# Patient Record
Sex: FEMALE | Race: ASIAN | NOT HISPANIC OR LATINO | Employment: STUDENT | URBAN - METROPOLITAN AREA
[De-identification: names, ages, dates, MRNs, and addresses within clinical notes are randomized per-mention and may not be internally consistent; named-entity substitution may affect disease eponyms.]

---

## 2021-06-07 ENCOUNTER — HOSPITAL ENCOUNTER (EMERGENCY)
Facility: HOSPITAL | Age: 31
Discharge: HOME | End: 2021-06-07
Attending: NURSE PRACTITIONER | Admitting: EMERGENCY MEDICINE

## 2021-06-07 VITALS
RESPIRATION RATE: 18 BRPM | BODY MASS INDEX: 32.21 KG/M2 | HEIGHT: 62 IN | TEMPERATURE: 98.3 F | DIASTOLIC BLOOD PRESSURE: 69 MMHG | HEART RATE: 66 BPM | OXYGEN SATURATION: 98 % | WEIGHT: 175.04 LBS | SYSTOLIC BLOOD PRESSURE: 122 MMHG

## 2021-06-07 DIAGNOSIS — L02.11 CUTANEOUS ABSCESS OF NECK: Primary | ICD-10-CM

## 2021-06-07 PROCEDURE — 99282 EMERGENCY DEPT VISIT SF MDM: CPT

## 2021-06-07 RX ORDER — DOXYCYCLINE HYCLATE 50 MG/1
100 CAPSULE ORAL 2 TIMES DAILY
Qty: 40 CAPSULE | Refills: 0 | Status: SHIPPED | OUTPATIENT
Start: 2021-06-07 | End: 2021-06-17

## 2021-06-07 NOTE — ED PROVIDER NOTES
Subjective   Tender lump on back of neck x 2 days.       History provided by:  Patient  Abscess  Location:  Head/neck  Head/neck abscess location:  L neck  Size:  Pea sized   Abscess quality: painful    Abscess quality: not draining, no fluctuance, no redness, no warmth and not weeping    Red streaking: no    Duration:  2 days  Progression:  Unchanged  Pain details:     Quality:  Aching    Severity:  Mild    Duration:  2 days    Timing:  Constant    Progression:  Unchanged  Chronicity:  New  Context: not diabetes and not insect bite/sting    Relieved by:  Nothing  Worsened by:  Nothing  Ineffective treatments:  None tried  Associated symptoms: headaches    Associated symptoms: no anorexia, no fatigue, no fever, no nausea and no vomiting        Review of Systems   Constitutional: Negative for fatigue and fever.   Eyes: Negative.    Respiratory: Negative.    Cardiovascular: Negative.    Gastrointestinal: Negative for anorexia, nausea and vomiting.   Genitourinary: Negative.    Musculoskeletal: Negative.    Neurological: Positive for headaches.   Psychiatric/Behavioral: Negative.        History reviewed. No pertinent past medical history.    Allergies   Allergen Reactions    Penicillins Anaphylaxis    Cephalosporins Hives       History reviewed. No pertinent surgical history.    History reviewed. No pertinent family history.    Social History     Socioeconomic History    Marital status:      Spouse name: Not on file    Number of children: Not on file    Years of education: Not on file    Highest education level: Not on file           Objective   Physical Exam  Nursing note reviewed.   Constitutional:       Appearance: Normal appearance.   HENT:      Head: Normocephalic.      Right Ear: Tympanic membrane normal.      Left Ear: Tympanic membrane normal.      Nose: Nose normal.      Mouth/Throat:      Mouth: Mucous membranes are moist.   Eyes:      Extraocular Movements: Extraocular movements intact.       Conjunctiva/sclera: Conjunctivae normal.      Pupils: Pupils are equal, round, and reactive to light.   Neck:      Comments: Posterior left side of neck with pea sized abcess, no fluctuance, no drainage, no erythema.  Cardiovascular:      Rate and Rhythm: Normal rate and regular rhythm.      Pulses: Normal pulses.   Pulmonary:      Effort: Pulmonary effort is normal.   Abdominal:      General: Abdomen is flat.   Musculoskeletal:         General: Normal range of motion.      Cervical back: Normal range of motion.   Neurological:      General: No focal deficit present.      Mental Status: She is alert.   Psychiatric:         Mood and Affect: Mood normal.         Procedures           ED Course                                           MDM  Number of Diagnoses or Management Options  Cutaneous abscess of neck: minor  Risk of Complications, Morbidity, and/or Mortality  Presenting problems: minimal  Diagnostic procedures: minimal  Management options: minimal        Final diagnoses:   Cutaneous abscess of neck       ED Disposition  ED Disposition       ED Disposition Condition Comment    Discharge Stable             MalikaAgusto hobbscash Cheryon, DO  [address] [facility] [place] [address]  [phone]    In 2 days  for wound recheck       Medication List        ASK your doctor about these medications      doxycycline 50 MG capsule  Commonly known as: VIBRAMYCIN  Take 2 capsules by mouth 2 (Two) Times a Day for 10 days.  Ask about: Should I take this medication?               Where to Get Your Medications        These medications were sent to [id number] - [place], [place] - [address] AT [facility] - [phone]  - [phone]  [address] [place] [address]      Hours: 24-hours Phone: [phone]   doxycycline 50 MG capsule          Nina Govea APRN  06/07/21 1803       Nina Govea APRN  06/07/21 2100       Nina Govea APRN  06/18/21 4625

## 2021-07-03 ENCOUNTER — HOSPITAL ENCOUNTER (EMERGENCY)
Facility: HOSPITAL | Age: 31
Discharge: HOME | End: 2021-07-03
Admitting: EMERGENCY MEDICINE

## 2021-07-03 ENCOUNTER — APPOINTMENT (OUTPATIENT)
Dept: GENERAL RADIOLOGY | Facility: HOSPITAL | Age: 31
End: 2021-07-03

## 2021-07-03 VITALS
OXYGEN SATURATION: 100 % | HEART RATE: 115 BPM | WEIGHT: 174.6 LBS | BODY MASS INDEX: 32.13 KG/M2 | RESPIRATION RATE: 22 BRPM | HEIGHT: 62 IN | TEMPERATURE: 98.1 F | DIASTOLIC BLOOD PRESSURE: 87 MMHG | SYSTOLIC BLOOD PRESSURE: 131 MMHG

## 2021-07-03 DIAGNOSIS — J06.9 VIRAL UPPER RESPIRATORY TRACT INFECTION: Primary | ICD-10-CM

## 2021-07-03 PROCEDURE — 71045 X-RAY EXAM CHEST 1 VIEW: CPT

## 2021-07-03 PROCEDURE — 94640 AIRWAY INHALATION TREATMENT: CPT

## 2021-07-03 PROCEDURE — 94799 UNLISTED PULMONARY SVC/PX: CPT

## 2021-07-03 PROCEDURE — 99282 EMERGENCY DEPT VISIT SF MDM: CPT

## 2021-07-03 RX ORDER — ALBUTEROL SULFATE 90 UG/1
2 AEROSOL, METERED RESPIRATORY (INHALATION) EVERY 4 HOURS PRN
Qty: 90 G | Refills: 0 | Status: SHIPPED | OUTPATIENT
Start: 2021-07-03

## 2021-07-03 RX ORDER — PREDNISONE 50 MG/1
50 TABLET ORAL DAILY
Qty: 5 TABLET | Refills: 0 | Status: SHIPPED | OUTPATIENT
Start: 2021-07-03 | End: 2021-08-24

## 2021-07-03 RX ORDER — CETIRIZINE HYDROCHLORIDE 10 MG/1
10 TABLET ORAL DAILY
COMMUNITY
End: 2022-01-26

## 2021-07-03 RX ORDER — IPRATROPIUM BROMIDE AND ALBUTEROL SULFATE 2.5; .5 MG/3ML; MG/3ML
3 SOLUTION RESPIRATORY (INHALATION) ONCE
Status: COMPLETED | OUTPATIENT
Start: 2021-07-03 | End: 2021-07-03

## 2021-07-03 RX ORDER — PHENTERMINE HYDROCHLORIDE 30 MG/1
30 CAPSULE ORAL EVERY MORNING
COMMUNITY
End: 2022-01-26

## 2021-07-03 RX ORDER — FLUTICASONE PROPIONATE 50 MCG
2 SPRAY, SUSPENSION (ML) NASAL DAILY
COMMUNITY
End: 2022-01-26

## 2021-07-03 RX ADMIN — IPRATROPIUM BROMIDE AND ALBUTEROL SULFATE 3 ML: .5; 2.5 SOLUTION RESPIRATORY (INHALATION) at 09:56

## 2021-07-04 NOTE — ED PROVIDER NOTES
Subjective     URI  Presenting symptoms: cough    Presenting symptoms: no congestion, no ear pain, no fever and no sore throat    Severity:  Mild  Onset quality:  Gradual  Duration:  2 days  Timing:  Constant  Progression:  Unchanged  Chronicity:  Recurrent  Relieved by:  Nothing  Worsened by:  Nothing  Ineffective treatments:  None tried  Associated symptoms: wheezing    Associated symptoms: no arthralgias, no headaches, no neck pain, no sinus pain, no sneezing and no swollen glands    Risk factors: not elderly, no chronic kidney disease, no chronic respiratory disease, no diabetes mellitus, no immunosuppression, no recent illness, no recent travel and no sick contacts        Review of Systems   Constitutional: Negative for chills and fever.   HENT: Negative for congestion, ear pain, sinus pain, sneezing and sore throat.    Eyes: Negative for pain.   Respiratory: Positive for cough and wheezing. Negative for chest tightness and shortness of breath.    Cardiovascular: Negative for chest pain.   Gastrointestinal: Negative for abdominal pain, diarrhea, nausea and vomiting.   Genitourinary: Negative for flank pain and hematuria.   Musculoskeletal: Negative for arthralgias, joint swelling and neck pain.   Skin: Negative for pallor.   Neurological: Negative for seizures and headaches.   All other systems reviewed and are negative.      Past Medical History:   Diagnosis Date   • Asthma    • Celiac disease        Allergies   Allergen Reactions   • Penicillins Anaphylaxis   • Naproxen Hives       History reviewed. No pertinent surgical history.    History reviewed. No pertinent family history.    Social History     Socioeconomic History   • Marital status:      Spouse name: Not on file   • Number of children: Not on file   • Years of education: Not on file   • Highest education level: Not on file   Tobacco Use   • Smoking status: Heavy Tobacco Smoker     Packs/day: 0.50   Vaping Use   • Vaping Use: Never used    Substance and Sexual Activity   • Alcohol use: Yes   • Drug use: Not Currently           Objective   Physical Exam  Vitals and nursing note reviewed.   Constitutional:       General: She is not in acute distress.     Appearance: Normal appearance. She is not toxic-appearing.   HENT:      Head: Normocephalic and atraumatic.      Mouth/Throat:      Mouth: Mucous membranes are moist.   Eyes:      Extraocular Movements: Extraocular movements intact.      Pupils: Pupils are equal, round, and reactive to light.   Cardiovascular:      Rate and Rhythm: Normal rate and regular rhythm.      Pulses: Normal pulses.      Heart sounds: Normal heart sounds.   Pulmonary:      Effort: Pulmonary effort is normal. No respiratory distress.      Breath sounds: Examination of the right-upper field reveals wheezing. Examination of the left-upper field reveals wheezing. Wheezing present. No decreased breath sounds.   Abdominal:      General: Abdomen is flat.      Palpations: Abdomen is soft.      Tenderness: There is no abdominal tenderness.   Musculoskeletal:         General: Normal range of motion.      Cervical back: Normal range of motion and neck supple.   Skin:     General: Skin is warm and dry.   Neurological:      Mental Status: She is alert and oriented to person, place, and time. Mental status is at baseline.         Procedures           ED Course                                           MDM  Number of Diagnoses or Management Options  Viral upper respiratory tract infection: minor  Diagnosis management comments: Patient has no respiratory distress, no signs of pneumonia.  She does have a history of asthma related to allergies.  I will send her home on some albuterol.  Patient feels better after 1 breathing treatment       Amount and/or Complexity of Data Reviewed  Clinical lab tests: reviewed  Tests in the radiology section of CPT®: ordered and reviewed  Tests in the medicine section of CPT®: reviewed    Risk of Complications,  Morbidity, and/or Mortality  Presenting problems: moderate  Diagnostic procedures: moderate  Management options: moderate        Final diagnoses:   Viral upper respiratory tract infection       ED Disposition  ED Disposition     ED Disposition Condition Comment    Discharge Stable           Topher Daly DO  [address] [facility] [address] [phone]               Medication List      New Prescriptions    albuterol sulfate  (90 Base) MCG/ACT inhaler  Commonly known as: PROVENTIL HFA;VENTOLIN HFA;PROAIR HFA  Inhale 2 puffs Every 4 (Four) Hours As Needed for Wheezing.     predniSONE 50 MG tablet  Commonly known as: DELTASONE  Take 1 tablet by mouth Daily.           Where to Get Your Medications      These medications were sent to [facility][id number] - [place], [place] - [address] AT [facility] - [phone] [facility] [phone]   [address] [place] [address]    Hours: 24-hours Phone: [phone]   · albuterol sulfate  (90 Base) MCG/ACT inhaler  · predniSONE 50 MG tablet          Wilfrido Desir APRN  07/04/21 1021

## 2021-08-24 ENCOUNTER — HOSPITAL ENCOUNTER (EMERGENCY)
Facility: HOSPITAL | Age: 31
Discharge: HOME | End: 2021-08-24
Attending: EMERGENCY MEDICINE | Admitting: EMERGENCY MEDICINE

## 2021-08-24 VITALS
SYSTOLIC BLOOD PRESSURE: 119 MMHG | BODY MASS INDEX: 30.95 KG/M2 | TEMPERATURE: 98.7 F | WEIGHT: 168.21 LBS | OXYGEN SATURATION: 97 % | HEART RATE: 99 BPM | RESPIRATION RATE: 18 BRPM | HEIGHT: 62 IN | DIASTOLIC BLOOD PRESSURE: 69 MMHG

## 2021-08-24 DIAGNOSIS — L73.9 FOLLICULITIS: Primary | ICD-10-CM

## 2021-08-24 LAB
ALBUMIN SERPL-MCNC: 4.1 G/DL (ref 3.5–5.2)
ALBUMIN/GLOB SERPL: 1.2 G/DL
ALP SERPL-CCNC: 83 U/L (ref 39–117)
ALT SERPL W P-5'-P-CCNC: 17 U/L (ref 1–33)
ANION GAP SERPL CALCULATED.3IONS-SCNC: 13 MMOL/L (ref 5–15)
AST SERPL-CCNC: 18 U/L (ref 1–32)
BASOPHILS # BLD AUTO: 0.08 10*3/MM3 (ref 0–0.2)
BASOPHILS NFR BLD AUTO: 0.5 % (ref 0–1.5)
BILIRUB SERPL-MCNC: 0.3 MG/DL (ref 0–1.2)
BUN SERPL-MCNC: 11 MG/DL (ref 6–20)
BUN/CREAT SERPL: 13.1 (ref 7–25)
CALCIUM SPEC-SCNC: 9.1 MG/DL (ref 8.6–10.5)
CHLORIDE SERPL-SCNC: 102 MMOL/L (ref 98–107)
CO2 SERPL-SCNC: 22 MMOL/L (ref 22–29)
CREAT SERPL-MCNC: 0.84 MG/DL (ref 0.57–1)
DEPRECATED RDW RBC AUTO: 37.2 FL (ref 37–54)
EOSINOPHIL # BLD AUTO: 1.27 10*3/MM3 (ref 0–0.4)
EOSINOPHIL NFR BLD AUTO: 8.1 % (ref 0.3–6.2)
ERYTHROCYTE [DISTWIDTH] IN BLOOD BY AUTOMATED COUNT: 11.9 % (ref 12.3–15.4)
GFR SERPL CREATININE-BSD FRML MDRD: 79 ML/MIN/1.73
GLOBULIN UR ELPH-MCNC: 3.3 GM/DL
GLUCOSE SERPL-MCNC: 89 MG/DL (ref 65–99)
HCG INTACT+B SERPL-ACNC: <0.5 MIU/ML
HCT VFR BLD AUTO: 36.2 % (ref 34–46.6)
HGB BLD-MCNC: 12.6 G/DL (ref 12–15.9)
IMM GRANULOCYTES # BLD AUTO: 0.05 10*3/MM3 (ref 0–0.05)
IMM GRANULOCYTES NFR BLD AUTO: 0.3 % (ref 0–0.5)
LYMPHOCYTES # BLD AUTO: 4.08 10*3/MM3 (ref 0.7–3.1)
LYMPHOCYTES NFR BLD AUTO: 25.9 % (ref 19.6–45.3)
MCH RBC QN AUTO: 30.1 PG (ref 26.6–33)
MCHC RBC AUTO-ENTMCNC: 34.8 G/DL (ref 31.5–35.7)
MCV RBC AUTO: 86.6 FL (ref 79–97)
MONOCYTES # BLD AUTO: 1.19 10*3/MM3 (ref 0.1–0.9)
MONOCYTES NFR BLD AUTO: 7.6 % (ref 5–12)
NEUTROPHILS NFR BLD AUTO: 57.6 % (ref 42.7–76)
NEUTROPHILS NFR BLD AUTO: 9.06 10*3/MM3 (ref 1.7–7)
NRBC BLD AUTO-RTO: 0 /100 WBC (ref 0–0.2)
PLATELET # BLD AUTO: 388 10*3/MM3 (ref 140–450)
PMV BLD AUTO: 9 FL (ref 6–12)
POTASSIUM SERPL-SCNC: 3.9 MMOL/L (ref 3.5–5.2)
PROT SERPL-MCNC: 7.4 G/DL (ref 6–8.5)
RBC # BLD AUTO: 4.18 10*6/MM3 (ref 3.77–5.28)
SODIUM SERPL-SCNC: 137 MMOL/L (ref 136–145)
WBC # BLD AUTO: 15.73 10*3/MM3 (ref 3.4–10.8)

## 2021-08-24 PROCEDURE — 99283 EMERGENCY DEPT VISIT LOW MDM: CPT

## 2021-08-24 PROCEDURE — 80053 COMPREHEN METABOLIC PANEL: CPT | Performed by: PHYSICIAN ASSISTANT

## 2021-08-24 PROCEDURE — 85025 COMPLETE CBC W/AUTO DIFF WBC: CPT | Performed by: PHYSICIAN ASSISTANT

## 2021-08-24 PROCEDURE — 25010000002 DEXAMETHASONE PER 1 MG: Performed by: PHYSICIAN ASSISTANT

## 2021-08-24 PROCEDURE — 96375 TX/PRO/DX INJ NEW DRUG ADDON: CPT

## 2021-08-24 PROCEDURE — 84702 CHORIONIC GONADOTROPIN TEST: CPT | Performed by: PHYSICIAN ASSISTANT

## 2021-08-24 PROCEDURE — 96374 THER/PROPH/DIAG INJ IV PUSH: CPT

## 2021-08-24 RX ORDER — SULFAMETHOXAZOLE AND TRIMETHOPRIM 800; 160 MG/1; MG/1
1 TABLET ORAL ONCE
Status: COMPLETED | OUTPATIENT
Start: 2021-08-24 | End: 2021-08-24

## 2021-08-24 RX ORDER — PREDNISONE 20 MG/1
20 TABLET ORAL DAILY
Qty: 4 TABLET | Refills: 0 | Status: SHIPPED | OUTPATIENT
Start: 2021-08-24 | End: 2022-01-26

## 2021-08-24 RX ORDER — SULFAMETHOXAZOLE AND TRIMETHOPRIM 800; 160 MG/1; MG/1
1 TABLET ORAL 2 TIMES DAILY
Qty: 14 TABLET | Refills: 0 | Status: SHIPPED | OUTPATIENT
Start: 2021-08-24 | End: 2022-01-26

## 2021-08-24 RX ORDER — DEXAMETHASONE SODIUM PHOSPHATE 10 MG/ML
10 INJECTION INTRAMUSCULAR; INTRAVENOUS ONCE
Status: COMPLETED | OUTPATIENT
Start: 2021-08-24 | End: 2021-08-24

## 2021-08-24 RX ORDER — DOXYCYCLINE 100 MG/1
100 CAPSULE ORAL 2 TIMES DAILY
Qty: 14 CAPSULE | Refills: 0 | Status: SHIPPED | OUTPATIENT
Start: 2021-08-24 | End: 2022-01-26

## 2021-08-24 RX ORDER — FAMOTIDINE 10 MG/ML
20 INJECTION, SOLUTION INTRAVENOUS ONCE
Status: COMPLETED | OUTPATIENT
Start: 2021-08-24 | End: 2021-08-24

## 2021-08-24 RX ORDER — SODIUM CHLORIDE 0.9 % (FLUSH) 0.9 %
10 SYRINGE (ML) INJECTION AS NEEDED
Status: DISCONTINUED | OUTPATIENT
Start: 2021-08-24 | End: 2021-08-24 | Stop reason: HOSPADM

## 2021-08-24 RX ADMIN — FAMOTIDINE 20 MG: 10 INJECTION INTRAVENOUS at 17:50

## 2021-08-24 RX ADMIN — DEXAMETHASONE SODIUM PHOSPHATE 10 MG: 10 INJECTION INTRAMUSCULAR; INTRAVENOUS at 17:51

## 2021-08-24 RX ADMIN — SULFAMETHOXAZOLE AND TRIMETHOPRIM 160 MG: 800; 160 TABLET ORAL at 17:50

## 2021-08-24 NOTE — ED PROVIDER NOTES
Subjective   Pt states she has atopic dermatitis and has started to develop places on other parts of the body that could be pus filled vs lymph node.      History provided by:  Patient   used: No    Abscess  Location:  Leg, shoulder/arm and torso  Leg abscess location:  L lower leg and R lower leg  Abscess quality: itching and painful    Abscess quality: no redness and no warmth    Red streaking: no    Duration:  2 days  Progression:  Worsening  Pain details:     Quality:  Dull    Severity:  Mild    Duration:  2 days    Timing:  Intermittent    Progression:  Worsening  Chronicity:  New  Relieved by:  Nothing  Worsened by:  Nothing  Ineffective treatments:  None tried  Associated symptoms: no fatigue and no fever    Rash  Associated symptoms: no fatigue and no fever        Review of Systems   Constitutional: Negative for appetite change, chills, fatigue and fever.   HENT: Negative.    Eyes: Negative.  Negative for photophobia.   Respiratory: Negative.    Gastrointestinal: Negative.    Endocrine: Negative.    Genitourinary: Negative.    Musculoskeletal: Negative.    Skin: Positive for rash.   Allergic/Immunologic: Negative.  Negative for immunocompromised state.   Neurological: Negative.    Hematological: Negative.    Psychiatric/Behavioral: Negative.    All other systems reviewed and are negative.      Past Medical History:   Diagnosis Date   • Asthma    • Atopic dermatitis    • Celiac disease        Allergies   Allergen Reactions   • Penicillins Anaphylaxis   • Naproxen Hives       Past Surgical History:   Procedure Laterality Date   • CHOLECYSTECTOMY     • OVARY SURGERY      ovarian cyst removed       History reviewed. No pertinent family history.    Social History     Socioeconomic History   • Marital status:      Spouse name: Not on file   • Number of children: Not on file   • Years of education: Not on file   • Highest education level: Not on file   Tobacco Use   • Smoking status:  Heavy Tobacco Smoker     Packs/day: 0.50   Vaping Use   • Vaping Use: Never used   Substance and Sexual Activity   • Alcohol use: Yes     Comment: socially   • Drug use: Not Currently           Objective   Physical Exam  Vitals and nursing note reviewed.   Constitutional:       General: She is not in acute distress.     Appearance: Normal appearance. She is not toxic-appearing.   HENT:      Head: Normocephalic and atraumatic.      Mouth/Throat:      Mouth: Mucous membranes are moist.   Eyes:      Extraocular Movements: Extraocular movements intact.      Pupils: Pupils are equal, round, and reactive to light.   Cardiovascular:      Rate and Rhythm: Normal rate and regular rhythm.      Pulses: Normal pulses.      Heart sounds: Normal heart sounds.   Pulmonary:      Effort: Pulmonary effort is normal. No respiratory distress.      Breath sounds: Normal breath sounds.   Abdominal:      General: Abdomen is flat.      Palpations: Abdomen is soft.      Tenderness: There is no abdominal tenderness.   Musculoskeletal:         General: Normal range of motion.      Cervical back: Normal range of motion and neck supple.   Skin:     General: Skin is warm and dry.      Comments: Small palpable areas on lower legs with area with folliculitis like presentation  Palpable areas on upper right back with no signs of induration, warmth or erythema   Neurological:      Mental Status: She is alert and oriented to person, place, and time. Mental status is at baseline.   Psychiatric:         Mood and Affect: Mood normal.         Behavior: Behavior normal.         Thought Content: Thought content normal.         Judgment: Judgment normal.         Procedures           ED Course                                           MDM  Number of Diagnoses or Management Options  Diagnosis management comments: Pt is a 30 y.o. female that presents today with Patient presents with:  Abscess       Work up today:  Lab Results (last 24 hours)     Procedure  Component Value Units Date/Time    CBC & Differential ([id number])  (Abnormal) Collected: 08/24/21 1624    Specimen: Blood Updated: 08/24/21 1631    Narrative:      The following orders were created for panel order CBC & Differential.  Procedure                               Abnormality         Status                       ---------                               -----------         ------                       CBC Auto Differential([id number])        Abnormal            Final result                   Please view results for these tests on the individual orders.    Comprehensive Metabolic Panel ([id number]) Collected: 08/24/21 1624    Specimen: Blood Updated: 08/24/21 1655     Glucose 89 mg/dL      BUN 11 mg/dL      Creatinine 0.84 mg/dL      Sodium 137 mmol/L      Potassium 3.9 mmol/L      Comment: Slight hemolysis detected by analyzer. Results may be affected.          Chloride 102 mmol/L      CO2 22.0 mmol/L      Calcium 9.1 mg/dL      Total Protein 7.4 g/dL      Albumin 4.10 g/dL      ALT (SGPT) 17 U/L      AST (SGOT) 18 U/L      Comment: Slight hemolysis detected by analyzer. Results may be affected.          Alkaline Phosphatase 83 U/L      Total Bilirubin 0.3 mg/dL      eGFR Non African Amer 79 mL/min/1.73      Globulin 3.3 gm/dL      A/G Ratio 1.2 g/dL      BUN/Creatinine Ratio 13.1     Anion Gap 13.0 mmol/L     Narrative:      GFR Normal >60  Chronic Kidney Disease <60  Kidney Failure <15      hCG, Quantitative, Pregnancy ([id number]) Collected: 08/24/21 1624    Specimen: Blood Updated: 08/24/21 1654     HCG Quantitative <0.50 mIU/mL     Narrative:      HCG Ranges by Gestational Age    Females - non-pregnant premenopausal   </= 1mIU/mL HCG  Females - postmenopausal               </= 7mIU/mL HCG    3 Weeks       5.4   -      72 mIU/mL  4 Weeks      10.2   -     708 mIU/mL  5 Weeks       217   -   8,245 mIU/mL  6 Weeks       152   -  32,177 mIU/mL  7 Weeks     4,059   - 153,767 mIU/mL  8 Weeks    31,366   -  149,094 mIU/mL  9 Weeks    59,109   - 135,901 mIU/mL  10 Weeks   44,186   - 170,409 mIU/mL  12 Weeks   27,107   - 201,615 mIU/mL  14 Weeks   24,302   -  93,646 mIU/mL  15 Weeks   12,540   -  69,747 mIU/mL  16 Weeks    8,904   -  55,332 mIU/mL  17 Weeks    8,240   -  51,793 mIU/mL  18 Weeks    9,649   -  55,271 mIU/mL    Results may be falsely decreased if patient taking Biotin.      CBC Auto Differential ([id number])  (Abnormal) Collected: 08/24/21 1624    Specimen: Blood Updated: 08/24/21 1631     WBC 15.73 10*3/mm3      RBC 4.18 10*6/mm3      Hemoglobin 12.6 g/dL      Hematocrit 36.2 %      MCV 86.6 fL      MCH 30.1 pg      MCHC 34.8 g/dL      RDW 11.9 %      RDW-SD 37.2 fl      MPV 9.0 fL      Platelets 388 10*3/mm3      Neutrophil % 57.6 %      Lymphocyte % 25.9 %      Monocyte % 7.6 %      Eosinophil % 8.1 %      Basophil % 0.5 %      Immature Grans % 0.3 %      Neutrophils, Absolute 9.06 10*3/mm3      Lymphocytes, Absolute 4.08 10*3/mm3      Monocytes, Absolute 1.19 10*3/mm3      Eosinophils, Absolute 1.27 10*3/mm3      Basophils, Absolute 0.08 10*3/mm3      Immature Grans, Absolute 0.05 10*3/mm3      nRBC 0.0 /100 WBC       No results found.   @No orders to display       Pt presents with history of atopic dermatitis and recent progression of erythematous areas on lower legs consistent with folliculitis and also a few areas around right breast and right upper shoulder.  Upper shoulder and right breast areas are mobile and tender no erythema or active drainage.   No cervical, axillary, or inguinal lymphadenopathy is appreciated on exam.   CBC does show mild elevation in WBC count with some increase in neutrophils.   I gave IV steroid and Bactrim while here. I will send patient home on more antibiotics for folliculitis with referral to derm and also to have PCP recheck labs in one week.  Vitals WNL. Denies fever or chills.  Pt is otherwise non toxic and non ill appearing and stable for d/c home.  Pt is in  agreement with this.  All questions answered at bedside.          Amount and/or Complexity of Data Reviewed  Clinical lab tests: reviewed    Risk of Complications, Morbidity, and/or Mortality  Presenting problems: low  Diagnostic procedures: low  Management options: low    Patient Progress  Patient progress: stable      Final diagnoses:   Folliculitis       ED Disposition  ED Disposition     ED Disposition Condition Comment    Discharge Stable           Anson Lopes MD  [address] [facility]  [place] [id number] [phone]    Schedule an appointment as soon as possible for a visit in 1 day      Topher Daly DO  [address] [facility] [place] [address]  [phone]    In 2 days  for recheck and possible redraw of blood work to monitor White blood count         Medication List      New Prescriptions    doxycycline 100 MG capsule  Commonly known as: MONODOX  Take 1 capsule by mouth 2 (Two) Times a Day.     predniSONE 20 MG tablet  Commonly known as: DELTASONE  Take 1 tablet by mouth Daily.     sulfamethoxazole-trimethoprim 800-160 MG per tablet  Commonly known as: BACTRIM DS,SEPTRA DS  Take 1 tablet by mouth 2 (Two) Times a Day.           Where to Get Your Medications      These medications were sent to [facility][id number] - [place] - [address] AT [facility] - [phone]  - [phone]   [address] [place] [address]    Hours: 24-hours Phone: [phone]   · doxycycline 100 MG capsule  · predniSONE 20 MG tablet  · sulfamethoxazole-trimethoprim 800-160 MG per tablet          Hernán Gardner PA  08/24/21 8619

## 2022-01-26 PROCEDURE — U0004 COV-19 TEST NON-CDC HGH THRU: HCPCS | Performed by: NURSE PRACTITIONER

## 2022-02-01 PROCEDURE — 87086 URINE CULTURE/COLONY COUNT: CPT | Performed by: NURSE PRACTITIONER

## 2022-04-17 ENCOUNTER — HOSPITAL ENCOUNTER (EMERGENCY)
Facility: HOSPITAL | Age: 32
Discharge: HOME | End: 2022-04-18
Attending: EMERGENCY MEDICINE | Admitting: EMERGENCY MEDICINE

## 2022-04-17 VITALS
HEIGHT: 62 IN | OXYGEN SATURATION: 96 % | SYSTOLIC BLOOD PRESSURE: 131 MMHG | DIASTOLIC BLOOD PRESSURE: 90 MMHG | WEIGHT: 180.34 LBS | TEMPERATURE: 98.1 F | HEART RATE: 78 BPM | RESPIRATION RATE: 16 BRPM | BODY MASS INDEX: 33.19 KG/M2

## 2022-04-17 DIAGNOSIS — L03.211 FACIAL CELLULITIS: Primary | ICD-10-CM

## 2022-04-17 PROCEDURE — 99283 EMERGENCY DEPT VISIT LOW MDM: CPT

## 2022-04-17 RX ORDER — DOXYCYCLINE 100 MG/1
100 CAPSULE ORAL 2 TIMES DAILY
Qty: 14 CAPSULE | Refills: 0 | Status: SHIPPED | OUTPATIENT
Start: 2022-04-17 | End: 2022-04-24

## 2022-04-17 RX ORDER — DOXYCYCLINE 100 MG/1
100 CAPSULE ORAL ONCE
Status: COMPLETED | OUTPATIENT
Start: 2022-04-18 | End: 2022-04-18

## 2022-04-18 RX ADMIN — DOXYCYCLINE 100 MG: 100 CAPSULE ORAL at 00:08

## 2022-04-18 NOTE — ED PROVIDER NOTES
Time: 11:26 PM EDT  Arrived by: TEMI  Chief Complaint: Facial swelling  History provided by: Patient      History of Present Illness:  Patient is a 31 y.o. year old female that presents to the emergency department with complaint of left facial swelling that started yesterday when she developed a small bump on her left cheek approximately 1 inch the corner of her mouth horizontally.  She denies any fever/chills, nausea/vomiting, difficulty swallowing, shortness of breath or other concerns.       used: No    Illness  Location:  Facial swelling,  Severity:  Moderate  Onset quality:  Sudden  Duration:  1 day  Timing:  Constant  Progression:  Worsening  Chronicity:  New  Relieved by:  Tylenol, ibuprofen  Worsened by:  Nothing  Ineffective treatments:  None tried  Associated symptoms: no congestion, no cough, no ear pain, no fever, no nausea, no rhinorrhea, no shortness of breath and no vomiting            Similar Symptoms Previously: No  Recently seen: No      Patient Care Team  Primary Care Provider: None listed    Past Medical History:     Allergies   Allergen Reactions   • Penicillins Anaphylaxis   • Naproxen Hives     Past Medical History:   Diagnosis Date   • Anxiety    • Asthma    • Atopic dermatitis    • Celiac disease      Past Surgical History:   Procedure Laterality Date   • CHOLECYSTECTOMY     • OVARY SURGERY      ovarian cyst removed     History reviewed. No pertinent family history.    Home Medications:  Prior to Admission medications    Medication Sig Start Date End Date Taking? Authorizing Provider   albuterol sulfate  (90 Base) MCG/ACT inhaler Inhale 2 puffs Every 4 (Four) Hours As Needed for Wheezing. 7/3/21   Wilfrido Desir APRN   brompheniramine-pseudoephedrine-DM 30-2-10 MG/5ML syrup Take 10 mL by mouth 4 (Four) Times a Day As Needed for Congestion, Cough or Allergies. 4/3/22   Jeremías Truong PA   cetirizine (zyrTEC) 10 MG tablet Take 1 tablet by mouth Daily for 30 days.  "2/15/22 3/17/22  Shanta Canchola APRN   divalproex (DEPAKOTE) 125 MG DR tablet Take 125 mg by mouth every night at bedtime.    Emergency, Nurse [facility], RN   fluticasone (FLONASE) 50 MCG/ACT nasal spray 2 sprays into the nostril(s) as directed by provider Daily for 30 days. 2/15/22 3/17/22  Shanta Canchola APRN        Social History:   PT  reports that she has been smoking. She has been smoking about 0.50 packs per day. She has never used smokeless tobacco. She reports current alcohol use. She reports previous drug use.    Record Review:  I have reviewed the patient's records in [facility].     Review of Systems  Review of Systems   Constitutional: Negative for fever.   HENT: Negative for congestion, ear pain and rhinorrhea.    Respiratory: Negative for cough and shortness of breath.    Gastrointestinal: Negative for nausea and vomiting.   Skin: Positive for wound.   All other systems reviewed and are negative.       Physical Exam    /90 (BP Location: Left arm, Patient Position: Sitting)   Pulse 78   Temp 98.1 °F (36.7 °C)   Resp 16   Ht 157.5 cm (62\")   Wt 81.8 kg (180 lb 5.4 oz)   SpO2 96%   BMI 32.98 kg/m²     Physical Exam  Vitals and nursing note reviewed.   Constitutional:       General: She is not in acute distress.     Appearance: Normal appearance. She is not toxic-appearing.   HENT:      Head: Normocephalic and atraumatic.        Comments: There is a small superficial abrasion on the left cheek with underlying swelling and mild erythema.  Lesion is painless.  No crepitus, no fluctuance, no pointing.     Right Ear: Tympanic membrane and ear canal normal.      Left Ear: Tympanic membrane and ear canal normal.      Mouth/Throat:      Lips: Pink.      Mouth: Mucous membranes are moist.      Pharynx: Uvula midline. No pharyngeal swelling, posterior oropharyngeal erythema or uvula swelling.      Tonsils: No tonsillar exudate.      Comments: No drooling noted  Eyes:      General: No scleral " "icterus.  Cardiovascular:      Rate and Rhythm: Normal rate and regular rhythm.      Pulses:           Radial pulses are 2+ on the right side and 2+ on the left side.      Heart sounds: Normal heart sounds.   Pulmonary:      Effort: Pulmonary effort is normal. No respiratory distress.      Breath sounds: Normal breath sounds. No wheezing or rhonchi.   Musculoskeletal:         General: Normal range of motion.      Cervical back: Normal range of motion and neck supple.   Lymphadenopathy:      Cervical: No cervical adenopathy.   Skin:     General: Skin is warm and dry.   Neurological:      Mental Status: She is alert and oriented to person, place, and time. Mental status is at baseline.                  ED Course  /90 (BP Location: Left arm, Patient Position: Sitting)   Pulse 78   Temp 98.1 °F (36.7 °C)   Resp 16   Ht 157.5 cm (62\")   Wt 81.8 kg (180 lb 5.4 oz)   SpO2 96%   BMI 32.98 kg/m²   Results for orders placed or performed during the hospital encounter of 04/03/22   POCT Influenza A/B    Specimen: Swab   Result Value Ref Range    Rapid Influenza A Ag Negative Negative    Rapid Influenza B Ag Negative Negative    Internal Control Passed Passed    Lot Number [id number]     Expiration Date 5,302,023    POCT SARS-CoV-2 Antigen DINA   ([facility])    Specimen: Swab   Result Value Ref Range    SARS Antigen Not Detected Not Detected    Internal Control Passed Passed    Lot Number [id number]     Expiration Date 3,292,023      Medications   doxycycline (MONODOX) capsule 100 mg (100 mg Oral Given 4/18/22 0008)     XR Chest 2 View    Result Date: 4/3/2022  Narrative: PROCEDURE: XR CHEST 2 VW  COMPARISON: [facility], , CHEST AP/PA 1 VIEW, 2/23/2021, 11:55.  INDICATIONS: shortness of breath  FINDINGS:  The cardiac silhouette is normal. The pulmonary vascular markings are normal. The lungs and pleural spaces are clear. The bony thorax is unremarkable.      Impression:  Normal " examination.      ROGELIO PEDRAZA MD       Electronically Signed and Approved By: ROGELIO PEDRAZA MD on 4/03/2022 at 16:38               Procedures/EKGs:  Procedures      Medical Decision Making:                     MDM  Number of Diagnoses or Management Options  Facial cellulitis  Diagnosis management comments: Patient symptoms are consistent with mild facial cellulitis.  The patient was started on antibiotics in the ED and prescription was sent to her pharmacy.  The patient was advised of return precautions and given follow-up instructions. I do not believe that the patient has an acute emergency medical condition requiring additional emergency management at this time. The patient is currently stable for outpatient treatment and continuation of care. Important signs and symptoms that would warrant return to the emergency department were reviewed. The patient was provided the opportunity to ask questions. All questions were addressed and the patient was discharged from the ED. The patient demonstrated understanding and agreed to plan    Risk of Complications, Morbidity, and/or Mortality  Presenting problems: minimal  Diagnostic procedures: minimal  Management options: minimal    Patient Progress  Patient progress: stable       Final diagnoses:   Facial cellulitis        Disposition:  ED Disposition     ED Disposition   Discharge    Condition   Stable    Comment   --                 A portion or all of this chart was created using voice recognition software.  Therefore errors and/or omissions may exist.  The chart was screened for these.     Lorrie Ballesteros, LAKEISHA  04/18/22 0101

## 2022-04-18 NOTE — DISCHARGE INSTRUCTIONS
Your symptoms appear consistent with a cellulitis, which is a skin infection.  Take antibiotics as prescribed.  Monitor your symptoms.  If an abscess develops return here or follow-up with your PCP or ENT for drainage.    Please return if your symptoms worsen such as increased swelling, developing redness, difficulty swallowing, difficulty breathing, fever, vomiting or any other concerns.

## 2022-05-07 PROCEDURE — 87081 CULTURE SCREEN ONLY: CPT
